# Patient Record
Sex: FEMALE | Race: WHITE | NOT HISPANIC OR LATINO | Employment: FULL TIME | URBAN - METROPOLITAN AREA
[De-identification: names, ages, dates, MRNs, and addresses within clinical notes are randomized per-mention and may not be internally consistent; named-entity substitution may affect disease eponyms.]

---

## 2023-05-04 ENCOUNTER — TELEPHONE (OUTPATIENT)
Dept: PRIMARY CARE | Facility: CLINIC | Age: 28
End: 2023-05-04
Payer: COMMERCIAL

## 2023-05-04 DIAGNOSIS — Z00.00 HEALTHCARE MAINTENANCE: Primary | ICD-10-CM

## 2023-05-04 PROBLEM — H61.22 IMPACTED CERUMEN OF LEFT EAR: Status: ACTIVE | Noted: 2023-05-04

## 2023-05-04 PROBLEM — N91.2 AMENORRHEA: Status: ACTIVE | Noted: 2023-05-04

## 2023-05-04 PROBLEM — R10.9 ABDOMINAL PAIN: Status: ACTIVE | Noted: 2023-05-04

## 2023-05-04 PROBLEM — M54.2 CERVICALGIA: Status: ACTIVE | Noted: 2023-05-04

## 2023-05-04 PROBLEM — E28.2 PCOS (POLYCYSTIC OVARIAN SYNDROME): Status: ACTIVE | Noted: 2023-05-04

## 2023-05-04 PROBLEM — E55.9 VITAMIN D DEFICIENCY: Status: ACTIVE | Noted: 2023-05-04

## 2023-05-04 PROBLEM — A59.9 TRICHOMONIASIS: Status: ACTIVE | Noted: 2023-05-04

## 2023-05-04 PROBLEM — M62.830 PARASPINAL MUSCLE SPASM: Status: ACTIVE | Noted: 2023-05-04

## 2023-05-04 PROBLEM — R19.06 EPIGASTRIC FULLNESS: Status: ACTIVE | Noted: 2023-05-04

## 2023-05-04 PROBLEM — M99.09 SEGMENTAL AND SOMATIC DYSFUNCTION: Status: ACTIVE | Noted: 2023-05-04

## 2023-05-04 PROBLEM — M79.9 POSTURAL STRAIN: Status: ACTIVE | Noted: 2023-05-04

## 2023-05-04 PROBLEM — M79.10 MYALGIA: Status: ACTIVE | Noted: 2023-05-04

## 2023-05-04 PROBLEM — N80.9 ENDOMETRIOSIS: Status: ACTIVE | Noted: 2023-05-04

## 2023-05-04 PROBLEM — M25.512 SHOULDER PAIN, LEFT: Status: ACTIVE | Noted: 2023-05-04

## 2023-05-04 PROBLEM — R10.2 PELVIC PAIN IN FEMALE: Status: ACTIVE | Noted: 2023-05-04

## 2023-05-04 PROBLEM — G54.0 THORACIC OUTLET SYNDROME: Status: ACTIVE | Noted: 2023-05-04

## 2023-05-04 PROBLEM — R53.83 FATIGUE: Status: ACTIVE | Noted: 2023-05-04

## 2023-05-04 PROBLEM — K21.9 GERD (GASTROESOPHAGEAL REFLUX DISEASE): Status: ACTIVE | Noted: 2023-05-04

## 2023-05-04 PROBLEM — F41.8 DEPRESSION WITH ANXIETY: Status: ACTIVE | Noted: 2023-05-04

## 2023-05-04 PROBLEM — M54.6 THORACIC SPINE PAIN: Status: ACTIVE | Noted: 2023-05-04

## 2023-05-04 PROBLEM — M89.9 SCAPULAR DYSFUNCTION: Status: ACTIVE | Noted: 2023-05-04

## 2023-05-04 PROBLEM — N89.8 VAGINAL DISCHARGE: Status: ACTIVE | Noted: 2023-05-04

## 2023-05-04 NOTE — TELEPHONE ENCOUNTER
Pt needs to have a tetanus booster for a new job, please advise if okay to schedule and pt also needs to have varicella tighter as well. Please advise     Normal for race

## 2023-05-08 ENCOUNTER — LAB (OUTPATIENT)
Dept: LAB | Facility: LAB | Age: 28
End: 2023-05-08
Payer: COMMERCIAL

## 2023-05-08 DIAGNOSIS — Z00.00 HEALTHCARE MAINTENANCE: ICD-10-CM

## 2023-05-08 PROCEDURE — 86787 VARICELLA-ZOSTER ANTIBODY: CPT

## 2023-05-08 PROCEDURE — 36415 COLL VENOUS BLD VENIPUNCTURE: CPT

## 2023-05-11 LAB — VARICELLA ZOSTER IGG: POSITIVE

## 2023-07-17 ENCOUNTER — APPOINTMENT (OUTPATIENT)
Dept: PRIMARY CARE | Facility: CLINIC | Age: 28
End: 2023-07-17
Payer: COMMERCIAL